# Patient Record
Sex: MALE | Race: WHITE | Employment: FULL TIME | URBAN - METROPOLITAN AREA
[De-identification: names, ages, dates, MRNs, and addresses within clinical notes are randomized per-mention and may not be internally consistent; named-entity substitution may affect disease eponyms.]

---

## 2017-02-23 ENCOUNTER — OFFICE VISIT (OUTPATIENT)
Dept: DERMATOLOGY CLINIC | Facility: CLINIC | Age: 27
End: 2017-02-23

## 2017-02-23 DIAGNOSIS — L21.9 SEBORRHEIC DERMATITIS: Primary | ICD-10-CM

## 2017-02-23 PROCEDURE — 99213 OFFICE O/P EST LOW 20 MIN: CPT | Performed by: DERMATOLOGY

## 2017-02-23 PROCEDURE — 99212 OFFICE O/P EST SF 10 MIN: CPT | Performed by: DERMATOLOGY

## 2017-02-23 RX ORDER — CHOLESTYRAMINE LIGHT 4 G/5.7G
POWDER, FOR SUSPENSION ORAL
Refills: 1 | COMMUNITY
Start: 2017-01-12

## 2017-02-23 RX ORDER — BETAMETHASONE DIPROPIONATE 0.5 MG/ML
1 LOTION, AUGMENTED TOPICAL
Qty: 60 ML | Refills: 6 | Status: SHIPPED | OUTPATIENT
Start: 2017-02-23 | End: 2020-02-18

## 2017-02-23 RX ORDER — CEFUROXIME AXETIL 250 MG/1
TABLET ORAL
Refills: 0 | COMMUNITY
Start: 2017-01-30 | End: 2020-02-18

## 2017-02-23 RX ORDER — BUPROPION HYDROCHLORIDE 150 MG/1
TABLET ORAL DAILY
Refills: 3 | COMMUNITY
Start: 2017-01-12

## 2017-02-23 RX ORDER — CICLOPIROX 1 G/100ML
SHAMPOO TOPICAL
Qty: 120 ML | Refills: 5 | Status: SHIPPED | OUTPATIENT
Start: 2017-02-23 | End: 2020-02-18

## 2017-02-23 RX ORDER — CITALOPRAM 10 MG/1
TABLET ORAL DAILY
Refills: 3 | COMMUNITY
Start: 2017-01-12

## 2017-02-23 RX ORDER — OMEPRAZOLE 20 MG/1
CAPSULE, DELAYED RELEASE ORAL AS NEEDED
Refills: 5 | COMMUNITY
Start: 2016-12-08

## 2017-02-23 NOTE — PROGRESS NOTES
HPI:     Chief Complaint     Dermatitis        HPI     Dermatitis    Additional comments: LOV 6/23/2016. Pt presenting for f/u with dermatitis to scalp and face. c/o pain 6/10 to scalp.  Previously treated with 2.5% selenium sulfide shampoo and clobetasol medical history. History reviewed. No pertinent past surgical history.     Social History   Marital Status: Unknown  Spouse Name: N/A    Years of Education: N/A  Number of Children: N/A     Occupational History  None on file     Social History Main Topics

## 2017-05-01 ENCOUNTER — OFFICE VISIT (OUTPATIENT)
Dept: DERMATOLOGY CLINIC | Facility: CLINIC | Age: 27
End: 2017-05-01

## 2017-05-01 DIAGNOSIS — L21.9 SEBORRHEIC DERMATITIS: Primary | ICD-10-CM

## 2017-05-01 PROCEDURE — 99212 OFFICE O/P EST SF 10 MIN: CPT | Performed by: DERMATOLOGY

## 2017-05-01 NOTE — PROGRESS NOTES
History reviewed. No pertinent past medical history. History reviewed. No pertinent past surgical history.     Social History   Marital Status: Unknown  Spouse Name: N/A    Years of Education: N/A  Number of Children: N/A     Occupational History  None on

## 2017-05-01 NOTE — PROGRESS NOTES
HPI:     Chief Complaint     Rash        HPI     Rash    Additional comments: Pt presents for 2 month f/u for seborrheic dermatitis to face and scalp.   Pt notes that skin of face is much improved, pt applies OTC hydrocortisone ointment as needed and washes Alcohol Use: Yes  0.0 oz/week    0 Standard drinks or equivalent per week         Comment: occasionally    Drug Use: Not on file    Sexual Activity: Not on file   Not on file  Other Topics Concern    Pt has a pacemaker No    Pt has a defibrillator No    Re

## 2019-10-09 ENCOUNTER — HOSPITAL ENCOUNTER (EMERGENCY)
Facility: HOSPITAL | Age: 29
Discharge: HOME | End: 2019-10-09
Attending: EMERGENCY MEDICINE
Payer: COMMERCIAL

## 2019-10-09 VITALS
RESPIRATION RATE: 18 BRPM | BODY MASS INDEX: 28.13 KG/M2 | OXYGEN SATURATION: 97 % | WEIGHT: 175 LBS | HEART RATE: 70 BPM | TEMPERATURE: 98 F | DIASTOLIC BLOOD PRESSURE: 70 MMHG | HEIGHT: 66 IN | SYSTOLIC BLOOD PRESSURE: 142 MMHG

## 2019-10-09 DIAGNOSIS — H00.011 HORDEOLUM EXTERNUM OF RIGHT UPPER EYELID: Primary | ICD-10-CM

## 2019-10-09 DIAGNOSIS — H57.89 PERIORBITAL SWELLING: ICD-10-CM

## 2019-10-09 PROCEDURE — 99283 EMERGENCY DEPT VISIT LOW MDM: CPT

## 2019-10-09 RX ORDER — ERYTHROMYCIN 5 MG/G
1 OINTMENT OPHTHALMIC EVERY 6 HOURS
Qty: 1 G | Refills: 0 | Status: SHIPPED | OUTPATIENT
Start: 2019-10-09 | End: 2019-10-16

## 2019-10-09 RX ORDER — ACETAMINOPHEN AND CODEINE PHOSPHATE 300; 30 MG/1; MG/1
1 TABLET ORAL EVERY 4 HOURS PRN
Qty: 10 TABLET | Refills: 0 | Status: SHIPPED | OUTPATIENT
Start: 2019-10-09

## 2019-10-09 RX ORDER — AMOXICILLIN AND CLAVULANATE POTASSIUM 875; 125 MG/1; MG/1
1 TABLET, FILM COATED ORAL 2 TIMES DAILY
Qty: 20 TABLET | Refills: 0 | Status: SHIPPED | OUTPATIENT
Start: 2019-10-09 | End: 2019-10-19

## 2019-10-09 RX ORDER — AMOXICILLIN AND CLAVULANATE POTASSIUM 875; 125 MG/1; MG/1
1 TABLET, FILM COATED ORAL 2 TIMES DAILY
Qty: 20 TABLET | Refills: 0 | Status: SHIPPED | OUTPATIENT
Start: 2019-10-09 | End: 2019-10-09

## 2019-10-09 NOTE — ED PROVIDER NOTES
Patient Seen in: [facility] Emergency Department      History   Patient presents with:  Eyelid Swelling    Stated Complaint: Eye Problem    HPI    27-year-old male with a history of hordeolum to the lower left eyelid but a month ago that got avery and oriented to person, place, and time. Skin: Skin is warm and dry. Nursing note and vitals reviewed. Differential diagnosis includes right eye stye or hordeolum, early periorbital cellulitis.       ED Course   Labs Reviewed - No data to display

## 2019-10-09 NOTE — ED INITIAL ASSESSMENT (HPI)
Pt c/o swelling to right upper eyelid+pain/pressure started yesterday that is getting worst this morning, denies visual problem, sts that he has swelling to right lower eyelid a month ago and was told by opthalmologist that he has stye outbreak which is no

## 2020-02-18 ENCOUNTER — OFFICE VISIT (OUTPATIENT)
Dept: DERMATOLOGY CLINIC | Facility: CLINIC | Age: 30
End: 2020-02-18
Payer: COMMERCIAL

## 2020-02-18 DIAGNOSIS — L21.9 SEBORRHEIC DERMATITIS: Primary | ICD-10-CM

## 2020-02-18 PROCEDURE — 99213 OFFICE O/P EST LOW 20 MIN: CPT | Performed by: DERMATOLOGY

## 2020-02-18 RX ORDER — CLOBETASOL PROPIONATE 0.46 MG/ML
SOLUTION TOPICAL
COMMUNITY
End: 2020-02-18

## 2020-02-18 RX ORDER — MOMETASONE FUROATE 1 MG/ML
SOLUTION TOPICAL
Qty: 60 ML | Refills: 6 | Status: SHIPPED | OUTPATIENT
Start: 2020-02-18

## 2020-02-18 RX ORDER — SELENIUM SULFIDE 2.5 MG/100ML
LOTION TOPICAL
COMMUNITY

## 2020-02-18 RX ORDER — ALPRAZOLAM 0.5 MG/1
TABLET ORAL
COMMUNITY
Start: 2019-11-07

## 2020-02-18 RX ORDER — DEXTROAMPHETAMINE SACCHARATE, AMPHETAMINE ASPARTATE MONOHYDRATE, DEXTROAMPHETAMINE SULFATE AND AMPHETAMINE SULFATE 5; 5; 5; 5 MG/1; MG/1; MG/1; MG/1
20 CAPSULE, EXTENDED RELEASE ORAL
COMMUNITY
Start: 2014-06-30 | End: 2020-02-18

## 2020-02-18 RX ORDER — BUPROPION HYDROCHLORIDE 300 MG/1
TABLET ORAL
COMMUNITY
Start: 2020-01-25

## 2020-02-18 RX ORDER — LOTEPREDNOL ETABONATE 5 MG/ML
1 SUSPENSION/ DROPS OPHTHALMIC
COMMUNITY

## 2020-02-18 RX ORDER — TOBRAMYCIN 3 MG/ML
1 SOLUTION/ DROPS OPHTHALMIC
COMMUNITY
Start: 2014-07-11 | End: 2020-02-18

## 2020-02-18 RX ORDER — BACLOFEN 10 MG/1
10 TABLET ORAL
COMMUNITY
Start: 2013-05-29 | End: 2020-02-18

## 2020-02-18 RX ORDER — METHYLPREDNISOLONE 4 MG/1
TABLET ORAL
COMMUNITY
Start: 2019-12-30

## 2020-02-18 RX ORDER — ACYCLOVIR 200 MG/1
CAPSULE ORAL
COMMUNITY

## 2020-02-18 RX ORDER — TACROLIMUS 1 MG/G
1 OINTMENT TOPICAL 2 TIMES DAILY
Qty: 60 G | Refills: 3 | Status: SHIPPED | OUTPATIENT
Start: 2020-02-18

## 2020-02-18 RX ORDER — DOXYCYCLINE HYCLATE 100 MG/1
100 CAPSULE ORAL
COMMUNITY
Start: 2019-11-20 | End: 2020-02-18

## 2020-02-18 RX ORDER — KETOCONAZOLE 20 MG/ML
SHAMPOO TOPICAL
Qty: 120 ML | Refills: 3 | Status: SHIPPED | OUTPATIENT
Start: 2020-02-18

## 2020-02-18 RX ORDER — DOXYCYCLINE HYCLATE 50 MG/1
CAPSULE ORAL
COMMUNITY
Start: 2020-01-08

## 2020-02-18 RX ORDER — ERYTHROMYCIN 5 MG/G
1 OINTMENT OPHTHALMIC
COMMUNITY

## 2020-02-18 NOTE — PROGRESS NOTES
HPI:     Chief Complaint     Derm Problem        HPI     Derm Problem      Additional comments: LOV 5/17/17. pt presenting today with dryness to bilateral eyebrows,eye lids and hair line for a year. pt  sates dryness has became worse in th winter months. p daily.     • acyclovir 200 MG Oral Cap acyclovir 200 mg capsule     • buPROPion HCl ER, XL, 300 MG Oral Tablet 24 Hr      • erythromycin 5 MG/GM Ophthalmic Ointment 1 Application. • Loteprednol Etabonate (LOTEMAX) 0.5 % Ophthalmic Suspension 1 drop. Relationships      Social connections:        Talks on phone: Not on file        Gets together: Not on file        Attends Yazdanism service: Not on file        Active member of club or organization: Not on file        Attends meetings of clubs or [place] as he has had some past diagnosis of rosacea overlapping. Would recommend calling for follow-up appointment in 4 to 6 weeks if not markedly better.   If doing well we will refill what ever he needs for up to a year    No orders of the defined types were pl

## (undated) NOTE — MR AVS SNAPSHOT
[facility] SPECIALTY [facility] - [place] [address] [address] [address]  [phone]               Thank you for choosing us for your health care visit with Danna Hernandez MD.  We are glad to serve you and happy to provide you with this summary of y Apply 1-2x/day for face as needed   Commonly known as:  ELIDEL                   MyChart     Call the [employer] for assistance with your inactive [employer] account    If you have questions, you can call [phone] to talk to our [facility] Staff.  Rem

## (undated) NOTE — ED AVS SNAPSHOT
Gumaro Narvaez   MRN: [id number]    Department:  [facility] Emergency Department   Date of Visit:  10/9/2019           Disclosure     Insurance plans vary and the physician(s) referred by the ER may not be covered by your plan.  Please contact y CARE PHYSICIAN AT ONCE OR RETURN IMMEDIATELY TO THE EMERGENCY DEPARTMENT. If you have been prescribed any medication(s), please fill your prescription right away and begin taking the medication(s) as directed.   If you believe that any of the medications

## (undated) NOTE — MR AVS SNAPSHOT
[facility] SPECIALTY [facility] - [place] [address] [address] [address] [phone]               Thank you for choosing us for your health care visit with Nathaniel Woodruff MD.  We are glad to serve you and happy to provide you with this summary of y Commonly known as:  ELIDEL                Where to Get Your Medications      These medications were sent to [address] [place] [address]/[facility], [place] - [address] [facility] [phone], [phone]  [address], [address] [address]     Phone:  [phone] Don’t forget strength training with weights and resistance Set goals and track your progress   You don’t need to join a gym. Home exercises work great.  Put more priority on exercise in your life                    Visit [facility] online at